# Patient Record
Sex: MALE | Race: WHITE | NOT HISPANIC OR LATINO | Employment: OTHER | URBAN - METROPOLITAN AREA
[De-identification: names, ages, dates, MRNs, and addresses within clinical notes are randomized per-mention and may not be internally consistent; named-entity substitution may affect disease eponyms.]

---

## 2023-04-18 ENCOUNTER — LAB (OUTPATIENT)
Dept: LAB | Facility: LAB | Age: 68
End: 2023-04-18
Payer: MEDICARE

## 2023-04-18 ENCOUNTER — OFFICE VISIT (OUTPATIENT)
Dept: PRIMARY CARE | Facility: CLINIC | Age: 68
End: 2023-04-18
Payer: MEDICARE

## 2023-04-18 VITALS
BODY MASS INDEX: 34.41 KG/M2 | WEIGHT: 279 LBS | OXYGEN SATURATION: 97 % | SYSTOLIC BLOOD PRESSURE: 142 MMHG | DIASTOLIC BLOOD PRESSURE: 82 MMHG | HEART RATE: 79 BPM

## 2023-04-18 DIAGNOSIS — Z01.818 PRE-OPERATIVE EXAM: ICD-10-CM

## 2023-04-18 DIAGNOSIS — R73.03 PREDIABETES: ICD-10-CM

## 2023-04-18 DIAGNOSIS — E04.9 THYROID ENLARGEMENT: ICD-10-CM

## 2023-04-18 DIAGNOSIS — R79.89 ELEVATED SERUM CREATININE: ICD-10-CM

## 2023-04-18 DIAGNOSIS — E78.1 HYPERTRIGLYCERIDEMIA: ICD-10-CM

## 2023-04-18 DIAGNOSIS — I77.810 AORTIC ECTASIA, THORACIC (CMS-HCC): Primary | ICD-10-CM

## 2023-04-18 DIAGNOSIS — I35.8: ICD-10-CM

## 2023-04-18 LAB
ALANINE AMINOTRANSFERASE (SGPT) (U/L) IN SER/PLAS: 31 U/L (ref 10–52)
ALBUMIN (G/DL) IN SER/PLAS: 4.7 G/DL (ref 3.4–5)
ALKALINE PHOSPHATASE (U/L) IN SER/PLAS: 54 U/L (ref 33–136)
ANION GAP IN SER/PLAS: 10 MMOL/L (ref 10–20)
ASPARTATE AMINOTRANSFERASE (SGOT) (U/L) IN SER/PLAS: 25 U/L (ref 9–39)
BILIRUBIN TOTAL (MG/DL) IN SER/PLAS: 0.5 MG/DL (ref 0–1.2)
CALCIUM (MG/DL) IN SER/PLAS: 9.6 MG/DL (ref 8.6–10.3)
CARBON DIOXIDE, TOTAL (MMOL/L) IN SER/PLAS: 30 MMOL/L (ref 21–32)
CHLORIDE (MMOL/L) IN SER/PLAS: 104 MMOL/L (ref 98–107)
CHOLESTEROL (MG/DL) IN SER/PLAS: 158 MG/DL (ref 0–199)
CHOLESTEROL IN HDL (MG/DL) IN SER/PLAS: 41 MG/DL
CHOLESTEROL/HDL RATIO: 3.9
CREATININE (MG/DL) IN SER/PLAS: 1.39 MG/DL (ref 0.5–1.3)
ESTIMATED AVERAGE GLUCOSE FOR HBA1C: 134 MG/DL
GFR MALE: 56 ML/MIN/1.73M2
GLUCOSE (MG/DL) IN SER/PLAS: 111 MG/DL (ref 74–99)
HEMOGLOBIN A1C/HEMOGLOBIN TOTAL IN BLOOD: 6.3 %
LDL: 93 MG/DL (ref 0–99)
POTASSIUM (MMOL/L) IN SER/PLAS: 4.7 MMOL/L (ref 3.5–5.3)
PROTEIN TOTAL: 7.6 G/DL (ref 6.4–8.2)
SODIUM (MMOL/L) IN SER/PLAS: 139 MMOL/L (ref 136–145)
TRIGLYCERIDE (MG/DL) IN SER/PLAS: 121 MG/DL (ref 0–149)
UREA NITROGEN (MG/DL) IN SER/PLAS: 40 MG/DL (ref 6–23)
VLDL: 24 MG/DL (ref 0–40)

## 2023-04-18 PROCEDURE — 1036F TOBACCO NON-USER: CPT | Performed by: STUDENT IN AN ORGANIZED HEALTH CARE EDUCATION/TRAINING PROGRAM

## 2023-04-18 PROCEDURE — 1159F MED LIST DOCD IN RCRD: CPT | Performed by: STUDENT IN AN ORGANIZED HEALTH CARE EDUCATION/TRAINING PROGRAM

## 2023-04-18 PROCEDURE — 83036 HEMOGLOBIN GLYCOSYLATED A1C: CPT

## 2023-04-18 PROCEDURE — 93000 ELECTROCARDIOGRAM COMPLETE: CPT | Performed by: STUDENT IN AN ORGANIZED HEALTH CARE EDUCATION/TRAINING PROGRAM

## 2023-04-18 PROCEDURE — 99213 OFFICE O/P EST LOW 20 MIN: CPT | Performed by: STUDENT IN AN ORGANIZED HEALTH CARE EDUCATION/TRAINING PROGRAM

## 2023-04-18 PROCEDURE — 36415 COLL VENOUS BLD VENIPUNCTURE: CPT

## 2023-04-18 PROCEDURE — 80053 COMPREHEN METABOLIC PANEL: CPT

## 2023-04-18 PROCEDURE — 80061 LIPID PANEL: CPT

## 2023-04-18 RX ORDER — FENOFIBRATE 48 MG/1
1 TABLET, FILM COATED ORAL DAILY
COMMUNITY
Start: 2022-10-10 | End: 2023-10-03 | Stop reason: SINTOL

## 2023-04-18 RX ORDER — ENALAPRIL MALEATE AND HYDROCHLOROTHIAZIDE 5; 12.5 MG/1; MG/1
1 TABLET ORAL DAILY
COMMUNITY
Start: 2021-08-30 | End: 2023-10-03 | Stop reason: SDUPTHER

## 2023-04-18 RX ORDER — ASPIRIN 81 MG/1
81 TABLET ORAL DAILY
COMMUNITY

## 2023-04-18 ASSESSMENT — PATIENT HEALTH QUESTIONNAIRE - PHQ9
1. LITTLE INTEREST OR PLEASURE IN DOING THINGS: NOT AT ALL
SUM OF ALL RESPONSES TO PHQ9 QUESTIONS 1 AND 2: 0
2. FEELING DOWN, DEPRESSED OR HOPELESS: NOT AT ALL

## 2023-04-18 NOTE — PROGRESS NOTES
Subjective   Patient ID: Ji Nguyen Jr. is a 68 y.o. male who presents for Follow-up (Patient is looking at having hip surgery on the left side. Ortho has concerns with A1C and patient states he needs cardiac clearance. ).    HPI    Patient reports that he is planning to have a hip surgery in the future.  He wants to optimize his health in preparation of the surgery.  Reports that he has a history of blood vessel dilatation.  His surgeon would like to get this reevaluated before planning for surgery.  Additionally recommended to discuss about diabetes as well.  Plan:  Hx of ectasia of ascending aorta. ECHO ordered for further assessment of ectasia.   EKG performed-normal.   Echocardiogram is ordered for further evaluation of ectasia of aorta.      Review of Systems  ROS negative except discussed above in HPI.    Vitals:    04/18/23 1008   BP: 142/82   Pulse: 79   SpO2: 97%     Objective   Physical Exam  Constitutional:       Appearance: Normal appearance.   Cardiovascular:      Rate and Rhythm: Normal rate and regular rhythm.   Pulmonary:      Effort: Pulmonary effort is normal.      Breath sounds: Normal breath sounds.   Musculoskeletal:      Cervical back: Normal range of motion and neck supple.   Lymphadenopathy:      Cervical: No cervical adenopathy.   Neurological:      Mental Status: He is alert.       Assessment/Plan   Ji was seen today for follow-up.  Diagnoses and all orders for this visit:  Aortic ectasia, thoracic (CMS/HCC) (Primary)  -     Cancel: Transthoracic Echo (TTE) Complete; Future  -     CT chest w IV contrast; Future  -     CT chest w IV contrast  -     Referral to Cardiology; Future  Pre-operative exam  -     Comprehensive Metabolic Panel; Future  -     ECG 12 lead (Clinic Performed)  Hypertriglyceridemia  -     Lipid Panel; Future  Prediabetes  -     Hemoglobin A1C; Future  -     Comprehensive Metabolic Panel; Future  Annuloaortic ectasia  -     Cancel: Transthoracic Echo (TTE) Complete;  Future  Elevated serum creatinine  -     Basic metabolic panel; Future  Thyroid enlargement  -     TSH with reflex to Free T4 if abnormal; Future  -     US thyroid; Future      Follow up in October as planned.          Paul Garcia MD MPH    Addendum with results: Creatinine elevated and GFR decreased. Will repeat in a week without fasting. If persistently low then will consider discontinuing fenofibrate.     CT with stable ectasia -4.3cm  Thyroid enlargement noticed. US ordered for further eval.    US of thyroid - goiter; possible thyroiditis. Nodules as well. Referral to endo is recommended. Patient will call us and let us know.

## 2023-04-26 ENCOUNTER — LAB (OUTPATIENT)
Dept: LAB | Facility: LAB | Age: 68
End: 2023-04-26
Payer: MEDICARE

## 2023-04-26 DIAGNOSIS — R79.89 ELEVATED SERUM CREATININE: ICD-10-CM

## 2023-04-26 LAB
ANION GAP IN SER/PLAS: 11 MMOL/L (ref 10–20)
CALCIUM (MG/DL) IN SER/PLAS: 9.3 MG/DL (ref 8.6–10.3)
CARBON DIOXIDE, TOTAL (MMOL/L) IN SER/PLAS: 30 MMOL/L (ref 21–32)
CHLORIDE (MMOL/L) IN SER/PLAS: 102 MMOL/L (ref 98–107)
CREATININE (MG/DL) IN SER/PLAS: 1.23 MG/DL (ref 0.5–1.3)
GFR MALE: 64 ML/MIN/1.73M2
GLUCOSE (MG/DL) IN SER/PLAS: 90 MG/DL (ref 74–99)
POTASSIUM (MMOL/L) IN SER/PLAS: 4 MMOL/L (ref 3.5–5.3)
SODIUM (MMOL/L) IN SER/PLAS: 139 MMOL/L (ref 136–145)
UREA NITROGEN (MG/DL) IN SER/PLAS: 34 MG/DL (ref 6–23)

## 2023-04-26 PROCEDURE — 80048 BASIC METABOLIC PNL TOTAL CA: CPT

## 2023-04-26 PROCEDURE — 36415 COLL VENOUS BLD VENIPUNCTURE: CPT

## 2023-05-10 ENCOUNTER — LAB (OUTPATIENT)
Dept: LAB | Facility: LAB | Age: 68
End: 2023-05-10
Payer: MEDICARE

## 2023-05-10 DIAGNOSIS — E04.9 THYROID ENLARGEMENT: ICD-10-CM

## 2023-05-10 LAB — THYROTROPIN (MIU/L) IN SER/PLAS BY DETECTION LIMIT <= 0.05 MIU/L: 1.24 MIU/L (ref 0.44–3.98)

## 2023-05-10 PROCEDURE — 84443 ASSAY THYROID STIM HORMONE: CPT

## 2023-05-10 PROCEDURE — 36415 COLL VENOUS BLD VENIPUNCTURE: CPT

## 2023-08-03 LAB
ALANINE AMINOTRANSFERASE (SGPT) (U/L) IN SER/PLAS: 28 U/L (ref 10–52)
ALBUMIN (G/DL) IN SER/PLAS: 4.6 G/DL (ref 3.4–5)
ALKALINE PHOSPHATASE (U/L) IN SER/PLAS: 59 U/L (ref 33–136)
ANION GAP IN SER/PLAS: 11 MMOL/L (ref 10–20)
ASPARTATE AMINOTRANSFERASE (SGOT) (U/L) IN SER/PLAS: 19 U/L (ref 9–39)
BASOPHILS (10*3/UL) IN BLOOD BY AUTOMATED COUNT: 0.04 X10E9/L (ref 0–0.1)
BASOPHILS/100 LEUKOCYTES IN BLOOD BY AUTOMATED COUNT: 0.6 % (ref 0–2)
BILIRUBIN TOTAL (MG/DL) IN SER/PLAS: 0.6 MG/DL (ref 0–1.2)
CALCIUM (MG/DL) IN SER/PLAS: 9.4 MG/DL (ref 8.6–10.3)
CARBON DIOXIDE, TOTAL (MMOL/L) IN SER/PLAS: 30 MMOL/L (ref 21–32)
CHLORIDE (MMOL/L) IN SER/PLAS: 102 MMOL/L (ref 98–107)
CREATININE (MG/DL) IN SER/PLAS: 1.1 MG/DL (ref 0.5–1.3)
EOSINOPHILS (10*3/UL) IN BLOOD BY AUTOMATED COUNT: 0.24 X10E9/L (ref 0–0.7)
EOSINOPHILS/100 LEUKOCYTES IN BLOOD BY AUTOMATED COUNT: 3.6 % (ref 0–6)
ERYTHROCYTE DISTRIBUTION WIDTH (RATIO) BY AUTOMATED COUNT: 12.9 % (ref 11.5–14.5)
ERYTHROCYTE MEAN CORPUSCULAR HEMOGLOBIN CONCENTRATION (G/DL) BY AUTOMATED: 33.4 G/DL (ref 32–36)
ERYTHROCYTE MEAN CORPUSCULAR VOLUME (FL) BY AUTOMATED COUNT: 96 FL (ref 80–100)
ERYTHROCYTES (10*6/UL) IN BLOOD BY AUTOMATED COUNT: 5.63 X10E12/L (ref 4.5–5.9)
ESTIMATED AVERAGE GLUCOSE FOR HBA1C: 128 MG/DL
GFR MALE: 73 ML/MIN/1.73M2
GLUCOSE (MG/DL) IN SER/PLAS: 122 MG/DL (ref 74–99)
HEMATOCRIT (%) IN BLOOD BY AUTOMATED COUNT: 53.9 % (ref 41–52)
HEMOGLOBIN (G/DL) IN BLOOD: 18 G/DL (ref 13.5–17.5)
HEMOGLOBIN A1C/HEMOGLOBIN TOTAL IN BLOOD: 6.1 %
IMMATURE GRANULOCYTES/100 LEUKOCYTES IN BLOOD BY AUTOMATED COUNT: 0.2 % (ref 0–0.9)
LEUKOCYTES (10*3/UL) IN BLOOD BY AUTOMATED COUNT: 6.6 X10E9/L (ref 4.4–11.3)
LYMPHOCYTES (10*3/UL) IN BLOOD BY AUTOMATED COUNT: 2.18 X10E9/L (ref 1.2–4.8)
LYMPHOCYTES/100 LEUKOCYTES IN BLOOD BY AUTOMATED COUNT: 33 % (ref 13–44)
MONOCYTES (10*3/UL) IN BLOOD BY AUTOMATED COUNT: 0.73 X10E9/L (ref 0.1–1)
MONOCYTES/100 LEUKOCYTES IN BLOOD BY AUTOMATED COUNT: 11.1 % (ref 2–10)
NEUTROPHILS (10*3/UL) IN BLOOD BY AUTOMATED COUNT: 3.4 X10E9/L (ref 1.2–7.7)
NEUTROPHILS/100 LEUKOCYTES IN BLOOD BY AUTOMATED COUNT: 51.5 % (ref 40–80)
PLATELETS (10*3/UL) IN BLOOD AUTOMATED COUNT: 207 X10E9/L (ref 150–450)
POTASSIUM (MMOL/L) IN SER/PLAS: 4.3 MMOL/L (ref 3.5–5.3)
PROTEIN TOTAL: 7.1 G/DL (ref 6.4–8.2)
SODIUM (MMOL/L) IN SER/PLAS: 139 MMOL/L (ref 136–145)
UREA NITROGEN (MG/DL) IN SER/PLAS: 32 MG/DL (ref 6–23)

## 2023-08-10 ENCOUNTER — TELEPHONE (OUTPATIENT)
Dept: PRIMARY CARE | Facility: CLINIC | Age: 68
End: 2023-08-10
Payer: MEDICARE

## 2023-08-10 NOTE — TELEPHONE ENCOUNTER
Patient called and said he is on vaction and has surgery with Dr. Gastelum on Monday for a total hip replacement. Patient is concerned that something is going to be wrong and he is going to have to push his surgery back. Patient said he is on vacation and wasn't sure if  could sign off on him having the surgery. Pt is on vacation at the moment and will leave to come to the apt if he has to but he says Dr. Gastelum never told him he needed clearance from his primary care.   Please call him as soon as possible.   Thank you

## 2023-08-11 ENCOUNTER — OFFICE VISIT (OUTPATIENT)
Dept: PRIMARY CARE | Facility: CLINIC | Age: 68
End: 2023-08-11
Payer: MEDICARE

## 2023-08-11 VITALS
HEART RATE: 72 BPM | SYSTOLIC BLOOD PRESSURE: 132 MMHG | WEIGHT: 277.6 LBS | DIASTOLIC BLOOD PRESSURE: 78 MMHG | BODY MASS INDEX: 34.24 KG/M2 | OXYGEN SATURATION: 93 %

## 2023-08-11 DIAGNOSIS — R73.03 PRE-DIABETES: ICD-10-CM

## 2023-08-11 DIAGNOSIS — Z01.818 PRE-OPERATIVE EXAM: Primary | ICD-10-CM

## 2023-08-11 DIAGNOSIS — I10 PRIMARY HYPERTENSION: ICD-10-CM

## 2023-08-11 PROBLEM — L91.8 SKIN TAG: Status: ACTIVE | Noted: 2023-08-11

## 2023-08-11 PROBLEM — M16.12 ARTHRITIS OF LEFT HIP: Status: ACTIVE | Noted: 2023-08-11

## 2023-08-11 PROBLEM — E78.1 HYPERTRIGLYCERIDEMIA: Status: ACTIVE | Noted: 2023-08-11

## 2023-08-11 PROBLEM — I77.810 ASCENDING AORTA DILATION (CMS-HCC): Status: ACTIVE | Noted: 2023-08-11

## 2023-08-11 PROBLEM — M25.552 HIP PAIN, LEFT: Status: ACTIVE | Noted: 2023-08-11

## 2023-08-11 PROBLEM — N52.9 MALE ERECTILE DISORDER: Status: ACTIVE | Noted: 2023-08-11

## 2023-08-11 PROBLEM — E66.9 OBESITY: Status: ACTIVE | Noted: 2023-08-11

## 2023-08-11 PROCEDURE — 1036F TOBACCO NON-USER: CPT | Performed by: STUDENT IN AN ORGANIZED HEALTH CARE EDUCATION/TRAINING PROGRAM

## 2023-08-11 PROCEDURE — 3078F DIAST BP <80 MM HG: CPT | Performed by: STUDENT IN AN ORGANIZED HEALTH CARE EDUCATION/TRAINING PROGRAM

## 2023-08-11 PROCEDURE — 3075F SYST BP GE 130 - 139MM HG: CPT | Performed by: STUDENT IN AN ORGANIZED HEALTH CARE EDUCATION/TRAINING PROGRAM

## 2023-08-11 PROCEDURE — 99213 OFFICE O/P EST LOW 20 MIN: CPT | Performed by: STUDENT IN AN ORGANIZED HEALTH CARE EDUCATION/TRAINING PROGRAM

## 2023-08-11 PROCEDURE — 1159F MED LIST DOCD IN RCRD: CPT | Performed by: STUDENT IN AN ORGANIZED HEALTH CARE EDUCATION/TRAINING PROGRAM

## 2023-08-11 RX ORDER — DICLOFENAC SODIUM 10 MG/G
GEL TOPICAL
COMMUNITY
Start: 2023-05-26

## 2023-08-11 ASSESSMENT — PATIENT HEALTH QUESTIONNAIRE - PHQ9
2. FEELING DOWN, DEPRESSED OR HOPELESS: NOT AT ALL
1. LITTLE INTEREST OR PLEASURE IN DOING THINGS: NOT AT ALL
SUM OF ALL RESPONSES TO PHQ9 QUESTIONS 1 AND 2: 0

## 2023-08-11 NOTE — PROGRESS NOTES
Subjective   Patient ID: Ji Nguyen Jr. is a 68 y.o. male who presents for Surgery Clearance (Surgery Clearance scheduled for Monday, did not know he needed to have clearance, thought he had everything in place. Found out he needed clearance so he had another EKG and labs done and they were ok with the recent stuff, wanted his PCP to do a once over before surgery. Is having a total hip replacement for his left. ).    HPI    Pre-operative clearance.     Patient is planned for a hip surgery on Monday.     Has had hx of appendicitis where he received anesthesia and did not have complications.   No bleeding diathesis.    Revised Cardiac Risk Index for Pre-Operative Risk  Estimates risk of cardiac complications after noncardiac surgery= 4    Has hx of prediabetes which is under control with A1C of 6.1. Diet controlled.       Review of Systems  ROS negative except discussed above in HPI.    Vitals:    08/11/23 1514   BP: 132/78   Pulse: 72   SpO2:      Objective   Physical Exam  Constitutional:       Appearance: Normal appearance.   Cardiovascular:      Rate and Rhythm: Normal rate and regular rhythm.   Pulmonary:      Effort: Pulmonary effort is normal.      Breath sounds: Normal breath sounds.   Musculoskeletal:      Cervical back: Normal range of motion and neck supple.      Comments: Antalgic gait due to hip pain.    Lymphadenopathy:      Cervical: No cervical adenopathy.   Neurological:      Mental Status: He is alert.       Assessment/Plan   Ji was seen today for surgery clearance.  Diagnoses and all orders for this visit:  Pre-operative exam (Primary)  Pre-diabetes  Primary hypertension    Patient medically stable for the procedure planned.    Follow up as needed.     Paul Garcia MD MPH

## 2023-08-14 ENCOUNTER — HOSPITAL ENCOUNTER (OUTPATIENT)
Dept: DATA CONVERSION | Facility: HOSPITAL | Age: 68
End: 2023-08-15
Attending: SPECIALIST | Admitting: SPECIALIST
Payer: MEDICARE

## 2023-08-14 DIAGNOSIS — M25.752 OSTEOPHYTE, LEFT HIP: ICD-10-CM

## 2023-08-14 DIAGNOSIS — M16.12 UNILATERAL PRIMARY OSTEOARTHRITIS, LEFT HIP: ICD-10-CM

## 2023-08-14 DIAGNOSIS — M25.552 PAIN IN LEFT HIP: ICD-10-CM

## 2023-08-15 LAB
ANION GAP IN SER/PLAS: 12 MMOL/L (ref 10–20)
BASOPHILS (10*3/UL) IN BLOOD BY AUTOMATED COUNT: 0.02 X10E9/L (ref 0–0.1)
BASOPHILS/100 LEUKOCYTES IN BLOOD BY AUTOMATED COUNT: 0.1 % (ref 0–2)
CALCIUM (MG/DL) IN SER/PLAS: 8.5 MG/DL (ref 8.6–10.3)
CARBON DIOXIDE, TOTAL (MMOL/L) IN SER/PLAS: 25 MMOL/L (ref 21–32)
CHLORIDE (MMOL/L) IN SER/PLAS: 99 MMOL/L (ref 98–107)
CREATININE (MG/DL) IN SER/PLAS: 1.17 MG/DL (ref 0.5–1.3)
ERYTHROCYTE DISTRIBUTION WIDTH (RATIO) BY AUTOMATED COUNT: 12.6 % (ref 11.5–14.5)
ERYTHROCYTE MEAN CORPUSCULAR HEMOGLOBIN CONCENTRATION (G/DL) BY AUTOMATED: 33.3 G/DL (ref 32–36)
ERYTHROCYTE MEAN CORPUSCULAR VOLUME (FL) BY AUTOMATED COUNT: 96 FL (ref 80–100)
ERYTHROCYTES (10*6/UL) IN BLOOD BY AUTOMATED COUNT: 4.25 X10E12/L (ref 4.5–5.9)
GFR MALE: 68 ML/MIN/1.73M2
GLUCOSE (MG/DL) IN SER/PLAS: 165 MG/DL (ref 74–99)
HEMATOCRIT (%) IN BLOOD BY AUTOMATED COUNT: 40.6 % (ref 41–52)
HEMOGLOBIN (G/DL) IN BLOOD: 13.5 G/DL (ref 13.5–17.5)
IMMATURE GRANULOCYTES/100 LEUKOCYTES IN BLOOD BY AUTOMATED COUNT: 0.3 % (ref 0–0.9)
LEUKOCYTES (10*3/UL) IN BLOOD BY AUTOMATED COUNT: 13.6 X10E9/L (ref 4.4–11.3)
LYMPHOCYTES (10*3/UL) IN BLOOD BY AUTOMATED COUNT: 1.5 X10E9/L (ref 1.2–4.8)
LYMPHOCYTES/100 LEUKOCYTES IN BLOOD BY AUTOMATED COUNT: 11 % (ref 13–44)
MONOCYTES (10*3/UL) IN BLOOD BY AUTOMATED COUNT: 1.59 X10E9/L (ref 0.1–1)
MONOCYTES/100 LEUKOCYTES IN BLOOD BY AUTOMATED COUNT: 11.7 % (ref 2–10)
NEUTROPHILS (10*3/UL) IN BLOOD BY AUTOMATED COUNT: 10.46 X10E9/L (ref 1.2–7.7)
NEUTROPHILS/100 LEUKOCYTES IN BLOOD BY AUTOMATED COUNT: 76.9 % (ref 40–80)
PLATELETS (10*3/UL) IN BLOOD AUTOMATED COUNT: 184 X10E9/L (ref 150–450)
POTASSIUM (MMOL/L) IN SER/PLAS: 4.3 MMOL/L (ref 3.5–5.3)
SODIUM (MMOL/L) IN SER/PLAS: 132 MMOL/L (ref 136–145)
UREA NITROGEN (MG/DL) IN SER/PLAS: 41 MG/DL (ref 6–23)

## 2023-08-28 ENCOUNTER — APPOINTMENT (OUTPATIENT)
Dept: PRIMARY CARE | Facility: CLINIC | Age: 68
End: 2023-08-28
Payer: MEDICARE

## 2023-09-29 VITALS — WEIGHT: 293.87 LBS | OXYGEN SATURATION: 99 % | BODY MASS INDEX: 39.8 KG/M2 | HEIGHT: 72 IN

## 2023-09-30 NOTE — PROGRESS NOTES
Service: Orthopaedics     Subjective Data:   ALTAGRACIA PALMA is a 68 year old Male who is Hospital Day # 2 and POD #1 for Left Total hip replacement.     doing well.  He walked in the hallway about 1/2 way.    Objective Data:     Objective Information:      T   P  R  BP   MAP  SpO2   Value  36.2  78  17  126/76      96%  Date/Time 8/15 3:21 8/15 3:21 8/15 3:21 8/15 3:21    8/15 3:21  Range  (36.1C - 36.6C )  (78 - 84 )  (17 - 18 )  (110 - 139 )/ (68 - 84 )    (91% - 96% )      Pain reported at 8/15 6:08: 2 = Mild      T   P  R  BP   MAP  SpO2   Value  36.2  78  17  126/76      96%  Date/Time 8/15 3:21 8/15 3:21 8/15 3:21 8/15 3:21    8/15 3:21  Range  (36.1C - 36.6C )  (78 - 84 )  (17 - 18 )  (110 - 139 )/ (68 - 84 )    (91% - 96% )        Pain reported at 8/15 6:08: 2 = Mild         Weights    15:15: Weight in kg (Weight (kg))  133.3   15:15: Weight in lbs ((lbs))  293.8   15:15: BMI (kg/m2) (BMI (kg/m2))  39.544    Physical Exam by System:    Extremities: left hip  dressing with min serosang.  NV intact distally  calves NT bilat  gentle ROM NT     Medication:    Medications:          Continuous Medications       --------------------------------    1. Lactated Ringers Infusion:  1000  mL  IntraVenous  <Continuous>    2. Lactated Ringers Infusion:  1000  mL  IntraVenous  <Continuous>         Scheduled Medications       --------------------------------    1. Docusate:  100  mg  Oral  2 Times a Day    2. Enoxaparin SubCutaneous:  40  mg  SubCutaneous  Every 24 Hours    3. Polyethylene Glycol:  17  gram(s)  Oral  2 Times a Day         PRN Medications       --------------------------------    1. Aspirin Enteric Coated:  325  mg  Oral  Daily    2. diphenhydrAMINE Injectable:  25  mg  IntraVenous Push  Every 4 Hours    3. HYDROcodone 5 mg - Acetaminophen 325 m  tablet(s)  Oral  Every 4 Hours    4. HYDROmorphone Injectable:  0.2  mg  IntraVenous Push  Every 5 Minutes    5. Morphine Injectable:  2  mg   IntraVenous Push  Every 2 Hours    6. Naloxone Injectable:  0.1  mg  IntraVenous Push  Every 5 Minutes    7. Ondansetron Injectable:  4  mg  IntraVenous Push  Every 6 Hours        Recent Lab Results:    Results:    CBC: 8/15/2023 05:10              \     Hgb     /                              \     13.5       /  WBC  ----------------  Plt               13.6 H    ----------------    184              /     Hct     \                              /     40.6 L    \            RBC: 4.25 L    MCV: 96     Neutrophil %: 76.9      BMP: 8/15/2023 05:10  NA+        Cl-     BUN  /                         132 L   99    41 H  /  --------------------------------  Glucose                ---------------------------  165 H    K+     HCO3-   Creat \                         4.3  25    1.17  \  Calcium : 8.5 L    Anion Gap : 12      Assessment and Plan:   Comorbidities:  ·  Comorbidity Other     Code Status:  ·  Code Status Full Code     Advance Care Planning:  Advance Care Planning: Patient didn't wish to or  was unable to provide advance care plan       Impression 1: POD# 1 THR   Plan for Impression 1: OK for d/c  Hip precautions discussed  Percocet for pain.  f/U 1 week for re-eval.       Electronic Signatures:  Dima Gastelum)  (Signed 15-Aug-2023 07:33)   Authored: Service, Subjective Data, Objective Data, Assessment  and Plan, Note Completion      Last Updated: 15-Aug-2023 07:33 by Dima Gastelum)

## 2023-09-30 NOTE — H&P
History & Physical Reviewed:   I have reviewed the History and Physical dated:  14-Aug-2023   History and Physical reviewed and relevant findings noted. Patient examined to review pertinent physical  findings.: No significant changes   Home Medications Reviewed: no changes noted   Allergies Reviewed: no changes noted       ERAS (Enhanced Recovery After Surgery):  ·  ERAS Patient: yes   ·  CPM/PAT Utilization: no   ·  Immunonutrition Recovery Drink Utilization: no   ·  Carbohydrate Supplement Drink Utilization: no     Consent:   COVID-19 Consent:  ·  COVID-19 Risk Consent Surgeon has reviewed key risks related to the risk of jules COVID-19 and if they contract COVID-19 what the risks are.       Electronic Signatures:  Dima Gastelum)  (Signed 14-Aug-2023 08:55)   Authored: History & Physical Reviewed, ERAS, Consent,  Note Completion      Last Updated: 14-Aug-2023 08:55 by Dima Gastelum)

## 2023-09-30 NOTE — DISCHARGE SUMMARY
Send Summary:   Discharge Summary Providers:  Provider Role Provider Name   · Attending Dima Gastelum   · Referring Dima Gastelum   · Primary Paul Garcia       Note Recipients: Dima Gastelum MD Mallapareddi, Arun Nag S, MD       Discharge:    Summary:   Admission Date: .14-Aug-2023 08:20:00   Discharge Date: 15-Aug-2023   Attending Physician at Discharge: Nirav   Admission Reason: THR   Final Discharge Diagnoses: Hip arthritis   Procedures: Date: 14-Aug-2023 13:42:00  Procedure Name: Left Total hip replacement   Condition at Discharge: Satisfactory   Disposition at Discharge: home   Vital Signs:        T   P  R  BP   MAP  SpO2   Value  36.2  78  17  126/76      96%  Date/Time 8/15 3:21 8/15 3:21 8/15 3:21 8/15 3:21    8/15 3:21  Range  (36.1C - 36.6C )  (78 - 84 )  (17 - 18 )  (110 - 139 )/ (68 - 84 )    (91% - 96% )    Date:            Weight/Scale Type:  Height:   01-Aug-2023 13:30  124.9  kg              Physical Exam:    see progress note.  Hospital Course:    Pt had a L THR on 8/14/23. He had an uneventful PO course.  D/C on ASA for DVT and percocet for pain.      Discharge Information:    and Continuing Care:   Lab Results - Pending:    None  Radiology Results - Pending: Xray Hip 1 View at 14-Aug-2023  14:55:00   Discharge Instructions:    Additional Orders:           Additional Instructions:   Discharge plan    ASA by mouth every day  Percocet for pain  ambulate with walker  ankle pumps  incentive spirometry  F/U with [facility] within 10 days\parF/U with Family Doctor within 2 weeks of D/C    Follow Up Appointments:    Follow-Up Appointment 01:           Physician/Dept/Service:   PCP          Call to Schedule in:   2 weeks    Follow-Up Appointment 02:           Physician/Dept/Service:   Nirav          Call to Schedule in:   1 week    Discharge Medications: Home Medication   enalapril-hydrochlorothiazide 5 mg-12.5 mg oral tablet - 1 tab(s) orally once a day  Omega Essentials 667 mg oral capsule - 1 cap(s)  orally once a day  Multiple Vitamins oral tablet - 1 tab(s) orally once a day  oxycodone-acetaminophen 5 mg-325 mg oral tablet - 1 tab(s) orally every 6 hours      PRN Medication     DNR Status:   ·  Code Status Code Status order at time of discharge: Full Code       Electronic Signatures:  Dima Gastelum)  (Signed 15-Aug-2023 07:46)   Authored: Send Summary, Summary Content, Ongoing Care,  DNR Status, Note Completion      Last Updated: 15-Aug-2023 07:46 by Dima Gastelum)

## 2023-10-01 NOTE — OP NOTE
PROCEDURE DETAILS    Preoperative Diagnosis:  Left hip OA   Postoperative Diagnosis:  Left hip OA   Surgeon: Nirav  Resident/Fellow/Other Assistant: Derrick    Procedure:  Left Total hip replacement  Estimated Blood Loss: 0  Findings: OA severe  Specimens(s) Collected: no,     Complications: None  Implants: #8 femur.  60mm acetabulum. -4 femoral neck        Operative Report:   Patient was advised of the risks, benefits, alternatives and complications of a TOTAL HIP ARTHROPLASTY and the patient agreed to proceed. The patient was informed  of the risk of DVT, infection, nerve and blood vessel injury. The patient understood that infection with a hip arthroplasty is a serious situation that may require further surgery and multiple visits to the operating room in order to eradicate the infection  in addition to long term IV antibiotics. This would be followed with a very high probability of removal of the implants and possible need for revision surgery. The risks of hip instability and dislocation were discussed. I reviewed hip precautions with  him and he agrees to adhere to these.  The risks of anesthesia were discussed.  The need for cemented fixation was discussed. Often, arthroplasty patients require blood transfusions intra-operative or post-operative.          Indications for Total Hip Arthroplasy:   The patient failed conservative management for Osteoarthritis of the hip. This often includes oral anti-inflammatory medications and activity modification. The patient had obvious findings of arthritis on pre-operative X-ray evaluation. The patient's  physical exam revealed tenderness over the groin and buttock especially with internal rotation. The patient had limitation of range of motion as well as an antalgic limp preoperatively. The level of hip pain was severe enough to compromise the patient's  overall health due to lack of activity as well as lack of sleep.  Therefore, this patient is indicated for a left  Total hip Replacement.    Informed Consent:   Patient was advised of the risks, benefits, alternatives and complications of a LEFT TOTAL HIP ARTHROPLASTY and the patient agreed to proceed. The patient was informed of the risk of DVT, infection, nerve and blood vessel injury. The patient understood  that infection with a hip arthroplasty is a serious situation that may require further surgery and multiple visits to the operating room to eradicate the infection in addition to long term IV antibiotics. This would be followed with a very high probability  of removal of the implants and possible need for revision surgery. The risks of hip instability and dislocation were discussed. I reviewed hip precautions with the patient and they agree to adhere to these.  The risks of anesthesia were discussed. The  risks of instrument failure were outlined. The need for cemented fixation was discussed. Often, arthroplasty patients require blood transfusions intra-operative or post-operative.    Procedure: The patient was identified verbally and using their hospital ID tag.  The maximino on the operative site which had been placed by the surgeon in the PACU was confirmed.  A surgical timeout was performed confirming and concurring this was the correct  patient and correct side and this was in agreement with the signed consent form.  Appropriate pre-operative antibiotics had been delivered. All the equipment necessary for the proposed procedure was available. Any necessary blood replacement or products  had been reserved. Medical issues affecting the surgery were discussed. A peg board was used for positioning in the lateral decubitus position. The patient´s positioning was secured, and all bony prominences and superficial nerves were inspected to  be well padded and free of compression. All present in the operating room were polled and agreed to proceed.    The non-operative side was dependent, and the operative hip was exposed to the  surgeon. The patient was appropriately sterilely prepped and draped. Surgical landmarks were outlined on the skin with an appropriate marker and the surgical incision was outlined.  An Ioban dressing was applied to the skin.     A curvilinear skin incision was made with a #10 blade scalpel.  This was taken from the posterior superior iliac spine across the posterior one-third of the greater trochanter and along the femoral shaft.  This was approximately 18 centimeters.  The subcutaneous  fat was opened with Bovie electrocautery and hemostasis was obtained.  The posterior fascia of the Gluteus Miranda was identified. The tensor facsia tonia was identified. This was incised sharply with the scalpel just distal to the tensor fascia muscle.   The surgeon´s finger was used to palpate the Gluteus Miranda insertion into the femur.  Once it was confirmed that the incision was anterior to the Gluteus Miranda insertion the fascia tonia was opened coincident with the skin incision. The external  fascia of the gluteus miranda was opened as well. The muscle was bluntly divided. Care was taken to control any perforating vessels.  A Charnley retractor was placed opening the two limbs of the fascial split. Care was taken to identify and protect the  sciatic nerve in this endeavor.     The femur was internally rotated.  The short external rotators, the piriformis tendon and the quadratus femorus were identified. The tendons for the gluteus medius and minimus were identified and using a periosteal elevator removed from the posterior  capsule of the hip. A blunt Cobra retractor was placed around the femoral neck to expose this and retract those tendons. The tendon of the piriformis was tagged with suture and the piriformis was detached from its fossa with Bovie electrocautery. The  other short external rotators were similarly tagged and detached. These were used to further protect the sciatic nerve. The quadratus femoris was detached in  its upper one-third as needed to expose the posterior capsule. The capsule was incised in a T  shape at the base of the femoral head and extending down to the acetabulum. The lessor trochanter was identified and easily palpable. The hip was then dislocated.  The femoral neck inclination was marked with Bovie, and a surgical saw was used to cut  the femoral neck and remove the head. The femoral head was measured on the back table. The acetabulum was cleared of soft tissue with Bovie electrocautery. The ligamentum teres was removed with Bovie electrocautery. An anterior acetabular retractor was  carefully placed to help retract the proximal femur and expose the acetabulum circumferentially. Any overhanging labrum was removed. A posterior and inferior retractor was placed. The adequately exposed the acetabulum. The pulvinar was cleared and a reamer  was used to deepen this to allow for medialization of the acetabular implant. Sequential reamers were then used up to the final reamer of 60 mm which was the appropriate one to allow for rim to rim fit. A trial was used to confirm the reaming was adequate  and to confirm the orientation of the implant. Some osteophytes were removed anterior and inferiorly.  Some direct posterior osteophytes were removed as well. The actual implant was then impacted in appropriate 25 degrees of anteversion and 45 degrees  of lateral tilt. The acetabular was checked for orientation and stability.  The acetabular liner was placed. A sponge was placed into the acetabulum to protect it during the femoral preparation.     The opening of the femoral canal was determined, and a canal finder was gently placed into the femoral shaft. The anteversion desired was determined. A canal finder was used. The shaft was sequentially broached until good fit and fill was obtained. Trial  reductions were performed. The femoral length was restored. The stability was tested with flexion to >90, Adduction to >45  and IR to >45.  The hip remained stable.  The leg lengths were equal, the hip was stable, and the shuck test had appropriate stability  as well.    At this point the actual implants were chosen. The femoral component was inserted until it was at the correct level and determined to be stable.  A trial reduction was attempted, and the actual head size and neck length was finalized. These were then  placed on the femoral trunnion and secured by malleting them in place. The hip was reduced and found to be stable with good leg lengths and appropriate motion and stability.  The wound was copiously irrigated with antibiotic irrigation. The capsule was closed with #2 Ethibond in a locking fashion. The capsule and piriformis  tendon tag sutures along with the other external rotators were brought through drill holes in the greater trochanter and secured.  The fascia Galina was closed with 2 Ethibond figure of eight interrupted sutures. The gluteus fascia was closed with 1 vicryl  running suture. The wound was irrigated again with antibiotic irrigation. The subcutaneous fat was approximated with 3-0 Vicryl simple sutures. The subcutaneous tissues were approximated with 3-0 Vicryl simple sutures. The skin was closed with steri-strips.  A dry sterile self- suctioning JOSEF dressing was applied All counts were correct at the end of the procedure. The patient tolerated the procedure well and returned to the recovery room in stable condition.                          Electronic Signatures:  Dima Gastelum)  (Signed 14-Aug-2023 13:50)   Authored: Post-Operative Note, Chart Review, Note Completion      Last Updated: 14-Aug-2023 13:50 by Dima Gastelum)

## 2023-10-03 ENCOUNTER — OFFICE VISIT (OUTPATIENT)
Dept: PRIMARY CARE | Facility: CLINIC | Age: 68
End: 2023-10-03
Payer: MEDICARE

## 2023-10-03 VITALS
HEART RATE: 68 BPM | HEIGHT: 72 IN | DIASTOLIC BLOOD PRESSURE: 80 MMHG | SYSTOLIC BLOOD PRESSURE: 128 MMHG | WEIGHT: 276 LBS | BODY MASS INDEX: 37.38 KG/M2

## 2023-10-03 DIAGNOSIS — Z12.11 ENCOUNTER FOR SCREENING FOR MALIGNANT NEOPLASM OF COLON: ICD-10-CM

## 2023-10-03 DIAGNOSIS — Z71.89 ADVANCE DIRECTIVE DISCUSSED WITH PATIENT: ICD-10-CM

## 2023-10-03 DIAGNOSIS — Z13.31 DEPRESSION SCREENING: ICD-10-CM

## 2023-10-03 DIAGNOSIS — Z71.89 CARDIAC RISK COUNSELING: ICD-10-CM

## 2023-10-03 DIAGNOSIS — Z12.5 PROSTATE CANCER SCREENING: ICD-10-CM

## 2023-10-03 DIAGNOSIS — Z00.00 ROUTINE GENERAL MEDICAL EXAMINATION AT A HEALTH CARE FACILITY: Primary | ICD-10-CM

## 2023-10-03 DIAGNOSIS — R73.03 PRE-DIABETES: ICD-10-CM

## 2023-10-03 DIAGNOSIS — E78.1 HYPERTRIGLYCERIDEMIA: ICD-10-CM

## 2023-10-03 DIAGNOSIS — I10 PRIMARY HYPERTENSION: ICD-10-CM

## 2023-10-03 PROCEDURE — G0446 INTENS BEHAVE THER CARDIO DX: HCPCS | Performed by: STUDENT IN AN ORGANIZED HEALTH CARE EDUCATION/TRAINING PROGRAM

## 2023-10-03 PROCEDURE — 1158F ADVNC CARE PLAN TLK DOCD: CPT | Performed by: STUDENT IN AN ORGANIZED HEALTH CARE EDUCATION/TRAINING PROGRAM

## 2023-10-03 PROCEDURE — 1170F FXNL STATUS ASSESSED: CPT | Performed by: STUDENT IN AN ORGANIZED HEALTH CARE EDUCATION/TRAINING PROGRAM

## 2023-10-03 PROCEDURE — 3079F DIAST BP 80-89 MM HG: CPT | Performed by: STUDENT IN AN ORGANIZED HEALTH CARE EDUCATION/TRAINING PROGRAM

## 2023-10-03 PROCEDURE — 99213 OFFICE O/P EST LOW 20 MIN: CPT | Performed by: STUDENT IN AN ORGANIZED HEALTH CARE EDUCATION/TRAINING PROGRAM

## 2023-10-03 PROCEDURE — G0444 DEPRESSION SCREEN ANNUAL: HCPCS | Performed by: STUDENT IN AN ORGANIZED HEALTH CARE EDUCATION/TRAINING PROGRAM

## 2023-10-03 PROCEDURE — 1160F RVW MEDS BY RX/DR IN RCRD: CPT | Performed by: STUDENT IN AN ORGANIZED HEALTH CARE EDUCATION/TRAINING PROGRAM

## 2023-10-03 PROCEDURE — 1159F MED LIST DOCD IN RCRD: CPT | Performed by: STUDENT IN AN ORGANIZED HEALTH CARE EDUCATION/TRAINING PROGRAM

## 2023-10-03 PROCEDURE — 99497 ADVNCD CARE PLAN 30 MIN: CPT | Performed by: STUDENT IN AN ORGANIZED HEALTH CARE EDUCATION/TRAINING PROGRAM

## 2023-10-03 PROCEDURE — G0439 PPPS, SUBSEQ VISIT: HCPCS | Performed by: STUDENT IN AN ORGANIZED HEALTH CARE EDUCATION/TRAINING PROGRAM

## 2023-10-03 PROCEDURE — 3074F SYST BP LT 130 MM HG: CPT | Performed by: STUDENT IN AN ORGANIZED HEALTH CARE EDUCATION/TRAINING PROGRAM

## 2023-10-03 PROCEDURE — 1036F TOBACCO NON-USER: CPT | Performed by: STUDENT IN AN ORGANIZED HEALTH CARE EDUCATION/TRAINING PROGRAM

## 2023-10-03 RX ORDER — EZETIMIBE 10 MG/1
10 TABLET ORAL DAILY
Qty: 30 TABLET | Refills: 5 | Status: SHIPPED | OUTPATIENT
Start: 2023-10-03 | End: 2023-10-03 | Stop reason: ALTCHOICE

## 2023-10-03 RX ORDER — ENALAPRIL MALEATE AND HYDROCHLOROTHIAZIDE 5; 12.5 MG/1; MG/1
1 TABLET ORAL DAILY
Qty: 90 TABLET | Refills: 3 | Status: SHIPPED | OUTPATIENT
Start: 2023-10-03 | End: 2024-10-02

## 2023-10-03 ASSESSMENT — ACTIVITIES OF DAILY LIVING (ADL)
BATHING: INDEPENDENT
MANAGING_FINANCES: INDEPENDENT
DOING_HOUSEWORK: INDEPENDENT
DRESSING: INDEPENDENT
GROCERY_SHOPPING: INDEPENDENT
TAKING_MEDICATION: INDEPENDENT

## 2023-10-03 NOTE — ACP (ADVANCE CARE PLANNING)
Confirming Previous Code Status:   Advance Care Planning Note     Discussion Date: 10/03/23   Discussion Participants: patient    The patient wishes to discuss Advance Care Planning today and the following is a brief summary of our discussion.     Patient has capacity to make their own medical decisions: Yes  Health Care Agent/Surrogate Decision Maker documented in chart: Yes    Documents on file and valid:  Advance Directive/Living Will: No   Health Care Power of : No    Communication of Medical Status/Prognosis:   Wife - Eunice Nguyen     Communication of Treatment Goals/Options:   Wife - Eunice Nguyen      Treatment Decisions  Goals of Care: survival is prioritized, if goals for quality or survival can reasonably be achieved     Follow Up Plan  1 year    Paul Garcia MD MPH  10/3/2023 8:09 AM

## 2023-10-03 NOTE — PROGRESS NOTES
Subjective   Reason for Visit: Ji Nguyen is an 68 y.o. male here for a Medicare Wellness visit.     Past Medical, Surgical, and Family History reviewed and updated in chart.    Reviewed all medications by prescribing practitioner or clinical pharmacist (such as prescriptions, OTCs, herbal therapies and supplements) and documented in the medical record.    Chief Complaint   Patient presents with    Medicare Annual Wellness Visit Subsequent     Med check ,pt c/o joint pain, needs different med for cholesterol      HPI  Reports that he is in overall good health.     Has undergone hip replacement and has recovered well.     Has generalized joint and back ache. Discussed conservative measures.     Has not been taking cholesterol medication. It has been causing hyperglycemia. Since stopping glucose levels have improved. Patient would like to control with diet and weight loss.     HTN: under control with current regimen.     Depression Screening done  Advanced Directives Discussion Completed  Cardiovascular risk discussed and if needed, lifestyle modifications recommended, including nutritional choices, exercise, and elimination of habits contributing to risk.  We agreed on a plan to reduce the current cardiovascular risk.  See ecalc ASCVD Risk  Plus for data discussed regarding risk and risk reduction opportunities.  Aspirin use/disuse was discussed after reviewing the updated guidelines.     The 10-year ASCVD risk score (Navid GROSSMAN, et al., 2019) is: 16.4%    Values used to calculate the score:      Age: 68 years      Sex: Male      Is Non- : No      Diabetic: No      Tobacco smoker: No      Systolic Blood Pressure: 128 mmHg      Is BP treated: Yes      HDL Cholesterol: 41 mg/dL      Total Cholesterol: 158 mg/dL    Patient Care Team:  Paul Garcia MD MPH as PCP - General  Paul Garcia MD MPH as PCP - [employer] PCP     Review of Systems  ROS  negative except discussed above in HPI.    Objective   Vitals:  /80   Pulse 68   Ht 1.829 m (6')   Wt 125 kg (276 lb)   BMI 37.43 kg/m²       Physical Exam  Constitutional:       Appearance: Normal appearance.   Cardiovascular:      Rate and Rhythm: Normal rate and regular rhythm.   Pulmonary:      Effort: Pulmonary effort is normal.      Breath sounds: Normal breath sounds.   Musculoskeletal:      Cervical back: Normal range of motion and neck supple.      Right lower leg: Edema (trace) present.      Left lower leg: Edema (trace) present.   Lymphadenopathy:      Cervical: No cervical adenopathy.   Neurological:      Mental Status: He is alert.         Assessment/Plan   Problem List Items Addressed This Visit       Pre-diabetes    Relevant Orders    Hemoglobin A1C    Hypertriglyceridemia    Relevant Orders    Lipid Panel    HTN (hypertension)    Relevant Medications    enalapriL-hydrochlorothiazide 5-12.5 mg tablet    Other Relevant Orders    Comprehensive Metabolic Panel     Other Visit Diagnoses       Encounter for screening for malignant neoplasm of colon    -  Primary    Relevant Orders    Colonoscopy Screening    Prostate cancer screening        Relevant Orders    Prostate Specific Antigen, Screen          Follow up in 1 year.

## 2023-10-06 ENCOUNTER — TELEPHONE (OUTPATIENT)
Dept: PRIMARY CARE | Facility: CLINIC | Age: 68
End: 2023-10-06
Payer: MEDICARE

## 2023-10-06 NOTE — TELEPHONE ENCOUNTER
I spoke to pt verified that his bp med was sent to mail order at his apt  he voiced understanding

## 2023-10-24 ENCOUNTER — TELEPHONE (OUTPATIENT)
Dept: ORTHOPEDIC SURGERY | Facility: CLINIC | Age: 68
End: 2023-10-24
Payer: MEDICARE

## 2023-10-24 NOTE — TELEPHONE ENCOUNTER
PATIENT CALLED FOR AN OKAY FROM DR MARTINEZ TO HAVE A PROCEDURE DONE FROM HIS FAMILY DOCTOR. HAD HIP REPLACEMENT ON 8/14/23. HIS FAMILY DOCTOR WANTED TO MAKE SURE THIS WAS GOING TO BE OKAY.     PLEASE CALL PT [phone]

## 2023-11-27 ENCOUNTER — ANCILLARY PROCEDURE (OUTPATIENT)
Dept: RADIOLOGY | Facility: CLINIC | Age: 68
End: 2023-11-27
Payer: MEDICARE

## 2023-11-27 DIAGNOSIS — M25.552 PAIN IN LEFT HIP: ICD-10-CM

## 2023-11-27 PROCEDURE — 73502 X-RAY EXAM HIP UNI 2-3 VIEWS: CPT | Mod: LEFT SIDE | Performed by: RADIOLOGY

## 2023-11-27 PROCEDURE — 73502 X-RAY EXAM HIP UNI 2-3 VIEWS: CPT | Mod: LT

## 2023-11-28 ENCOUNTER — TELEPHONE (OUTPATIENT)
Dept: ORTHOPEDIC SURGERY | Facility: CLINIC | Age: 68
End: 2023-11-28

## 2023-11-28 ENCOUNTER — OFFICE VISIT (OUTPATIENT)
Dept: ORTHOPEDIC SURGERY | Facility: CLINIC | Age: 68
End: 2023-11-28
Payer: MEDICARE

## 2023-11-28 DIAGNOSIS — M25.552 HIP PAIN, LEFT: ICD-10-CM

## 2023-11-28 PROCEDURE — 99214 OFFICE O/P EST MOD 30 MIN: CPT | Performed by: SPECIALIST

## 2023-11-28 PROCEDURE — 76882 US LMTD JT/FCL EVL NVASC XTR: CPT | Performed by: SPECIALIST

## 2023-11-28 PROCEDURE — 1036F TOBACCO NON-USER: CPT | Performed by: SPECIALIST

## 2023-11-28 PROCEDURE — 1125F AMNT PAIN NOTED PAIN PRSNT: CPT | Performed by: SPECIALIST

## 2023-11-28 PROCEDURE — 1160F RVW MEDS BY RX/DR IN RCRD: CPT | Performed by: SPECIALIST

## 2023-11-28 PROCEDURE — 1158F ADVNC CARE PLAN TLK DOCD: CPT | Performed by: SPECIALIST

## 2023-11-28 PROCEDURE — 1159F MED LIST DOCD IN RCRD: CPT | Performed by: SPECIALIST

## 2023-11-28 RX ORDER — CLINDAMYCIN HYDROCHLORIDE 300 MG/1
600 CAPSULE ORAL ONCE AS NEEDED
Qty: 2 CAPSULE | Refills: 0 | Status: SHIPPED | OUTPATIENT
Start: 2023-11-28

## 2023-11-28 ASSESSMENT — PAIN - FUNCTIONAL ASSESSMENT: PAIN_FUNCTIONAL_ASSESSMENT: 0-10

## 2023-11-28 ASSESSMENT — PAIN SCALES - GENERAL: PAINLEVEL_OUTOF10: 1

## 2023-11-28 ASSESSMENT — PAIN DESCRIPTION - DESCRIPTORS: DESCRIPTORS: DULL

## 2023-11-28 NOTE — PROGRESS NOTES
Assessment/Plan   Encounter Diagnoses:  Hip pain, left  Hip pain, left  Assessment: Left hip total hip arthroplasty doing well after surgery.  He is 3 months from the procedure.  His only complaint is some start up pain which resolves after a few steps.    Plan:  Continue with his exercise program at home.  Follow-up in 3 months for reevaluation with new x-rays.  I discussed dental work and we gave him a prescription for clindamycin 600 to be used 30 minutes before dental procedures.  He is having some work done on a crown.       Subjective    Patient ID: Altagracia Nguyen is a 68 y.o. male.    Chief Complaint: Post-op of the Left Hip (X-Ray 11-28-23/Pain Rate 1/POV 8-14-23/No Ice oe Heat)     Last Surgery: No surgery found  Last Surgery Date: No surgery found    HPI  68-year-old who is 3-month status post left total hip replacement.  He states he is doing well with this.  His endurance and distance is increasing.  He is not using a cane anymore.  He does have some start up pain for a few steps.    OBJECTIVE: ORTHO EXAM  Left hip:    Well-healed incisional scar  Essentially normal gait with some antalgia on start up.  Slight Trendelenburg sign on start up  Skin healthy and intact  No tenderness to palpation over lumbar spine  No tenderness over greater trochanter  No tenderness referable to groin especially with IR     Full forward flexion  Symmetric motion, no loss of internal rotation   No weakness with resisted hip flexion, abduction or adduction     Negative flexion/adduction/internal rotation  Negative flexion/abduction/external rotation test  Negative straight leg raise  Neurovascular exam normal distally  IMAGE RESULTS:  XR hip w pelvis  Narrative: Interpreted By:  JAMES JO MD  MRN: [id number]  Patient Name: ALTAGRACIA NGUYEN     STUDY:  HIP, UNILATERAL W/PELVIS WHEN PERFORMED 2-3 VIEWS     INDICATION:  POST OP CARE  Z96.642: Status post left hip replacement.     COMPARISON:  August 14     ACCESSION  NUMBER(S):  [id number]     ORDERING CLINICIAN:  DIMA MARTINEZ     FINDINGS:  Left total hip arthroplasty without fracture, malalignment, or  periprosthetic lucency.     Impression: Satisfactory appearance left total hip arthroplasty.      ULTRASOUND  DIAGNOSTIC ULTRASOUND REPORT FINAL: Left HIP  Sonographer: Dima Martinez MD  Indication:  Hip Pain  Procedure: Ultrasound, extremity, nonvascular, real-time, COMPLETE, anatomic specific  Technique: B-Mode Ultrasound Examination performed using 8-13 MHz linear transducer with [employer]  STUDY TYPE:   1. ULTRASOUND EXTREMITY  2. REAL TIME WITH IMAGE DOCUMENTATION  3. NON VASCULAR  4. COMPLETE STUDY, INCLUDING BUT NOT LIMITED TO MUSCLE, TENDONS, LIGAMENTS, SOFT TISSUES, ADIPOSE TISSUE AND SUBCUTANEOUS TISSUE.  Site:   HIP  Live ultrasound was performed of the patient's  HIP and PERMANENTLY documented. This is a COMPLETE and FINAL ULTRASOUND REPORT of the patient's  HIP.  Non sterile gloves were used for this procedure with a sterile technique. Gauze pads were then used to clean the area after the procedure was compete. . I personally performed the ultrasound and reviewed the findings. These show:  Live ultrasound was performed of the patient's HIP that shows an intact IT BAND with the vastus lateralis muscle fibers showing normal striations.  No fluid amount noted within the trochanteric bursa. No evidence of osseous pathology deep to the trochanteric bursa.  No significant soft tissue fluid collection/abscess appreciated.  No joint effusion noted. The patient tolerated the procedure well.  No hematoma or free fluid is noted about the incision.  Procedures     Orders Placed This Encounter    Point of Care Ultrasound    clindamycin (Cleocin HCL) 300 mg capsule

## 2023-11-28 NOTE — ASSESSMENT & PLAN NOTE
Assessment: Left hip total hip arthroplasty doing well after surgery.  He is 3 months from the procedure.  His only complaint is some start up pain which resolves after a few steps.    Plan:  Continue with his exercise program at home.  Follow-up in 3 months for reevaluation with new x-rays.  I discussed dental work and we gave him a prescription for clindamycin 600 to be used 30 minutes before dental procedures.  He is having some work done on a crown.

## 2023-11-28 NOTE — TELEPHONE ENCOUNTER
----- Message from Daria Walters sent at 11/28/2023 10:25 AM EST -----  Regarding: HANDICAP TAG  PATIENT CALLED STATING THAT DR MARTINEZ TOLD HIM ABOUT THE HANDICAPPED TAG AND HE SAID HE WOULD THINK ABOUT IT AND CALL US. HE CALLED AND SAID HE THOUGHT ABOUT THE HANDICAPPED TAG AND STATED THAT HE WANTED TO GET IT. I LET PATIENT KNOW THAT I WOULD SEND A MESSAGE TO THE NURSE TO GET THAT ORDER PRINTED FOR HIM SO HE CAN PICK IT UP. PLEASE CONTACT PATIENT WHEN THIS IS READY TO BE PICKED UP.

## 2023-12-22 ENCOUNTER — TELEPHONE (OUTPATIENT)
Dept: SURGERY | Facility: CLINIC | Age: 68
End: 2023-12-22
Payer: MEDICARE

## 2023-12-22 NOTE — TELEPHONE ENCOUNTER
Left patient message to return call to schedule colonoscopy.     Spoke with pt on 4-11-24 and pt states  doesn't want pt laying down at this time. Pt will call back in the future to schedule colonoscopy.

## 2024-02-28 ENCOUNTER — HOSPITAL ENCOUNTER (OUTPATIENT)
Dept: RADIOLOGY | Facility: CLINIC | Age: 69
Discharge: HOME | End: 2024-02-28
Payer: MEDICARE

## 2024-02-28 DIAGNOSIS — M25.552 HIP PAIN, LEFT: ICD-10-CM

## 2024-02-28 PROCEDURE — 73502 X-RAY EXAM HIP UNI 2-3 VIEWS: CPT | Mod: LT

## 2024-02-28 PROCEDURE — 73502 X-RAY EXAM HIP UNI 2-3 VIEWS: CPT | Mod: LEFT SIDE

## 2024-02-29 ENCOUNTER — OFFICE VISIT (OUTPATIENT)
Dept: ORTHOPEDIC SURGERY | Facility: CLINIC | Age: 69
End: 2024-02-29
Payer: MEDICARE

## 2024-02-29 VITALS — BODY MASS INDEX: 39.06 KG/M2 | WEIGHT: 288 LBS

## 2024-02-29 DIAGNOSIS — M25.552 HIP PAIN, LEFT: ICD-10-CM

## 2024-02-29 PROCEDURE — 1160F RVW MEDS BY RX/DR IN RCRD: CPT | Performed by: SPECIALIST

## 2024-02-29 PROCEDURE — 1159F MED LIST DOCD IN RCRD: CPT | Performed by: SPECIALIST

## 2024-02-29 PROCEDURE — 99213 OFFICE O/P EST LOW 20 MIN: CPT | Performed by: SPECIALIST

## 2024-02-29 PROCEDURE — 1036F TOBACCO NON-USER: CPT | Performed by: SPECIALIST

## 2024-02-29 PROCEDURE — 1125F AMNT PAIN NOTED PAIN PRSNT: CPT | Performed by: SPECIALIST

## 2024-02-29 ASSESSMENT — PAIN - FUNCTIONAL ASSESSMENT: PAIN_FUNCTIONAL_ASSESSMENT: NO/DENIES PAIN

## 2024-02-29 NOTE — PROGRESS NOTES
Assessment/Plan   Encounter Diagnoses:  Hip pain, left  Hip pain, left  Assessment: Left hip total hip arthroplasty doing well after surgery.  He is 6 months from the procedure.  His only complaint is some start up pain which resolves after a few steps. This is much less frequent now.    Plan:  Continue with his exercise program at home.  Follow-up in 6 months for reevaluation with new x-rays.  I discussed dental work  I discussed hip precautions  I discussed avoiding jumping and ballistic motions as he has a ceramic head       Subjective    Patient ID: Ji Nguyen is a 69 y.o. male.    Chief Complaint: Follow-up of the Left Hip     Last Surgery: No surgery found  Last Surgery Date: No surgery found    HPI  68-year-old who is 3-month status post left total hip replacement.  He states he is doing well with this.  His endurance and distance is increasing.  He is not using a cane anymore.  He does have some start up pain for a few steps.    OBJECTIVE: ORTHO EXAM  Left hip:    Well-healed incisional scar  Essentially normal gait with some antalgia on start up.  Slight Trendelenburg sign on start up  Skin healthy and intact  No tenderness to palpation over lumbar spine  No tenderness over greater trochanter  No tenderness referable to groin especially with IR     Full forward flexion  Symmetric motion, no loss of internal rotation   No weakness with resisted hip flexion, abduction or adduction     Negative flexion/adduction/internal rotation  Negative flexion/abduction/external rotation test  Negative straight leg raise  Neurovascular exam normal distally  IMAGE RESULTS:  XR hip left 2 or 3 views  Narrative: Interpreted By:  Cl Munoz,   STUDY:  XR HIP LEFT 2 OR 3 VIEWS;  11/27/2023 8:50 am      INDICATION:  Signs/Symptoms: post-op care  M25.552: Hip pain, left.      COMPARISON:  08/24/2020      ACCESSION NUMBER(S):  [id number]      ORDERING CLINICIAN:  ETHAN MARTINEZ      TECHNIQUE:  4 radiographs of the left  hip and pelvis were performed.      FINDINGS:  The patient is status post total left hip arthroplasty. The implant  is well aligned. There is no sign of chuy-implant fracture or focal  lucency. There is no bone destruction or aggressive periosteal  reaction. No lytic or blastic lesions identified.      There are moderate to severe osteoarthritic changes of the right hip  with narrowing of the superior joint space similar to the previous  study. 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      There is bilateral sacroiliac osteoarthritis. The sacroiliac joints  remain patent and symmetric. There are mild discogenic degenerative  changes of the lower lumbar spine with levo convexity.      Impression: Status post total left hip arthroplasty. There is no sign of hardware  failure or chuy-implant fracture.      Persistent moderate to severe osteoarthritic changes of the right hip  and cam deformity of the proximal right femur.      No acute osseous abnormality.      Signed by: Cl Munoz 11/28/2023 6:26 PM  Dictation workstation:   [id number]      ULTRASOUND  none  Procedures     Orders Placed This Encounter    XR hip left with pelvis when performed 2 or 3 views    Point of Care Ultrasound

## 2024-02-29 NOTE — ASSESSMENT & PLAN NOTE
Assessment: Left hip total hip arthroplasty doing well after surgery.  He is 6 months from the procedure.  His only complaint is some start up pain which resolves after a few steps. This is much less frequent now.    Plan:  Continue with his exercise program at home.  Follow-up in 6 months for reevaluation with new x-rays.  I discussed dental work  I discussed hip precautions  I discussed avoiding jumping and ballistic motions as he has a ceramic head

## 2024-08-21 ENCOUNTER — HOSPITAL ENCOUNTER (OUTPATIENT)
Dept: RADIOLOGY | Facility: CLINIC | Age: 69
Discharge: HOME | End: 2024-08-21
Payer: MEDICARE

## 2024-08-21 DIAGNOSIS — M25.552 HIP PAIN, LEFT: ICD-10-CM

## 2024-08-21 PROCEDURE — 73502 X-RAY EXAM HIP UNI 2-3 VIEWS: CPT | Mod: LT

## 2024-08-23 ENCOUNTER — HOSPITAL ENCOUNTER (OUTPATIENT)
Dept: RADIOLOGY | Facility: EXTERNAL LOCATION | Age: 69
Discharge: HOME | End: 2024-08-23

## 2024-08-23 ENCOUNTER — APPOINTMENT (OUTPATIENT)
Dept: ORTHOPEDIC SURGERY | Facility: CLINIC | Age: 69
End: 2024-08-23
Payer: MEDICARE

## 2024-08-23 DIAGNOSIS — M25.552 HIP PAIN, LEFT: ICD-10-CM

## 2024-08-23 PROCEDURE — 99213 OFFICE O/P EST LOW 20 MIN: CPT | Performed by: SPECIALIST

## 2024-08-23 PROCEDURE — 1160F RVW MEDS BY RX/DR IN RCRD: CPT | Performed by: SPECIALIST

## 2024-08-23 PROCEDURE — 1036F TOBACCO NON-USER: CPT | Performed by: SPECIALIST

## 2024-08-23 PROCEDURE — 1159F MED LIST DOCD IN RCRD: CPT | Performed by: SPECIALIST

## 2024-08-23 ASSESSMENT — PAIN - FUNCTIONAL ASSESSMENT: PAIN_FUNCTIONAL_ASSESSMENT: NO/DENIES PAIN

## 2024-08-23 NOTE — PROGRESS NOTES
Assessment/Plan   Encounter Diagnoses:  Hip pain, left  Hip pain, left  Assessment: 1 year status post 8/14/2023 left total hip arthroplasty.  Patient is very pleased with his progress.  He has minimal symptoms referable to the hip.  Sometimes if he sits for very long time when he first gets up it takes a few steps for him to get his normal gait.  If he lies on that side on a hard surface he does get soreness.    Plan:  I discussed posterior dislocation precautions and encouraged him to keep this in the front of his mind so as to avoid problems.  He no longer needs to be concerned about antibiotics for dental work.  He does need to be concerned about antibiotics for any kind of infection.  Follow-up in 1 year for reevaluation with new x-rays of the AP pelvis and left hip.       Subjective    Patient ID: Ji Nguyen is a 69 y.o. male.    Chief Complaint: Follow-up of the Left Hip     Last Surgery: No surgery found  Last Surgery Date: No surgery found    HPI  69-year-old who is doing quite well 1 year status post total hip arthroplasty.    OBJECTIVE: ORTHO EXAM  Left hip exam.  Normal leg lengths.  The incision is clean and dry.  Range of motion of the hip was full within the limits of total hip arthroplasty.  He had no pain.  His calves are supple and nontender.  His thigh was supple and nontender.  He ambulates with a normal gait.  He states that very rarely if he has been sitting for a while he will have a few steps of antalgia.    IMAGE RESULTS:  Point of Care Ultrasound  These images are not reportable by radiology and will not be interpreted   by  Radiologists.  XR hip left with pelvis when performed 2 or 3 views  Narrative: Interpreted By:  Mansoor Calderon,   STUDY:  XR HIP LEFT WITH PELVIS WHEN PERFORMED 2 OR 3 VIEWS      INDICATION:  Signs/Symptoms:CHRONIC PAIN.      COMPARISON:  February 28, 2024      ACCESSION NUMBER(S):  [id number]      ORDERING CLINICIAN:  ETHAN MARTINEZ      FINDINGS:  Left total hip  arthroplasty without fracture, malalignment, or  periprosthetic lucency.      Impression: Satisfactory appearance left hip arthroplasty.      Signed by: Mansoor Calderon 8/23/2024 7:20 AM  Dictation workstation:   [id number]      ULTRASOUND  None    Procedures     Orders Placed This Encounter    Point of Care Ultrasound    Point of Care Ultrasound

## 2024-08-23 NOTE — ASSESSMENT & PLAN NOTE
Assessment: 1 year status post 8/14/2023 left total hip arthroplasty.  Patient is very pleased with his progress.  He has minimal symptoms referable to the hip.  Sometimes if he sits for very long time when he first gets up it takes a few steps for him to get his normal gait.  If he lies on that side on a hard surface he does get soreness.    Plan:  I discussed posterior dislocation precautions and encouraged him to keep this in the front of his mind so as to avoid problems.  He no longer needs to be concerned about antibiotics for dental work.  He does need to be concerned about antibiotics for any kind of infection.  Follow-up in 1 year for reevaluation with new x-rays of the AP pelvis and left hip.

## 2024-10-07 ENCOUNTER — OFFICE VISIT (OUTPATIENT)
Dept: PRIMARY CARE | Facility: CLINIC | Age: 69
End: 2024-10-07
Payer: MEDICARE

## 2024-10-07 ENCOUNTER — APPOINTMENT (OUTPATIENT)
Dept: PRIMARY CARE | Facility: CLINIC | Age: 69
End: 2024-10-07
Payer: MEDICARE

## 2024-10-07 VITALS
BODY MASS INDEX: 38.48 KG/M2 | OXYGEN SATURATION: 94 % | SYSTOLIC BLOOD PRESSURE: 150 MMHG | WEIGHT: 283.7 LBS | HEART RATE: 74 BPM | DIASTOLIC BLOOD PRESSURE: 90 MMHG

## 2024-10-07 DIAGNOSIS — Z00.00 HEALTHCARE MAINTENANCE: ICD-10-CM

## 2024-10-07 DIAGNOSIS — R73.03 PRE-DIABETES: ICD-10-CM

## 2024-10-07 DIAGNOSIS — Z12.11 COLON CANCER SCREENING: ICD-10-CM

## 2024-10-07 DIAGNOSIS — Z00.00 ROUTINE GENERAL MEDICAL EXAMINATION AT HEALTH CARE FACILITY: Primary | ICD-10-CM

## 2024-10-07 DIAGNOSIS — Z12.5 ENCOUNTER FOR SCREENING FOR MALIGNANT NEOPLASM OF PROSTATE: ICD-10-CM

## 2024-10-07 DIAGNOSIS — I77.810 AORTIC ECTASIA, THORACIC (CMS-HCC): ICD-10-CM

## 2024-10-07 DIAGNOSIS — I10 PRIMARY HYPERTENSION: ICD-10-CM

## 2024-10-07 PROCEDURE — 3080F DIAST BP >= 90 MM HG: CPT | Performed by: FAMILY MEDICINE

## 2024-10-07 PROCEDURE — 1160F RVW MEDS BY RX/DR IN RCRD: CPT | Performed by: FAMILY MEDICINE

## 2024-10-07 PROCEDURE — 1159F MED LIST DOCD IN RCRD: CPT | Performed by: FAMILY MEDICINE

## 2024-10-07 PROCEDURE — 99214 OFFICE O/P EST MOD 30 MIN: CPT | Performed by: FAMILY MEDICINE

## 2024-10-07 PROCEDURE — 3077F SYST BP >= 140 MM HG: CPT | Performed by: FAMILY MEDICINE

## 2024-10-07 PROCEDURE — 1158F ADVNC CARE PLAN TLK DOCD: CPT | Performed by: FAMILY MEDICINE

## 2024-10-07 PROCEDURE — 1123F ACP DISCUSS/DSCN MKR DOCD: CPT | Performed by: FAMILY MEDICINE

## 2024-10-07 PROCEDURE — G0439 PPPS, SUBSEQ VISIT: HCPCS | Performed by: FAMILY MEDICINE

## 2024-10-07 PROCEDURE — 1170F FXNL STATUS ASSESSED: CPT | Performed by: FAMILY MEDICINE

## 2024-10-07 PROCEDURE — 1036F TOBACCO NON-USER: CPT | Performed by: FAMILY MEDICINE

## 2024-10-07 RX ORDER — ENALAPRIL MALEATE AND HYDROCHLOROTHIAZIDE 5; 12.5 MG/1; MG/1
1 TABLET ORAL DAILY
Qty: 90 TABLET | Refills: 3 | Status: CANCELLED | OUTPATIENT
Start: 2024-10-07 | End: 2025-10-07

## 2024-10-07 RX ORDER — ENALAPRIL MALEATE AND HYDROCHLOROTHIAZIDE 10; 25 MG/1; MG/1
1 TABLET ORAL DAILY
Qty: 90 TABLET | Refills: 1 | Status: SHIPPED | OUTPATIENT
Start: 2024-10-07 | End: 2025-10-07

## 2024-10-07 ASSESSMENT — ENCOUNTER SYMPTOMS
DEPRESSION: 0
HYPERTENSION: 1
OCCASIONAL FEELINGS OF UNSTEADINESS: 0
LOSS OF SENSATION IN FEET: 0

## 2024-10-07 ASSESSMENT — ACTIVITIES OF DAILY LIVING (ADL)
DRESSING: INDEPENDENT
TAKING_MEDICATION: INDEPENDENT
BATHING: INDEPENDENT
DOING_HOUSEWORK: INDEPENDENT
MANAGING_FINANCES: INDEPENDENT
GROCERY_SHOPPING: INDEPENDENT

## 2024-10-07 ASSESSMENT — PATIENT HEALTH QUESTIONNAIRE - PHQ9
2. FEELING DOWN, DEPRESSED OR HOPELESS: NOT AT ALL
SUM OF ALL RESPONSES TO PHQ9 QUESTIONS 1 AND 2: 0
1. LITTLE INTEREST OR PLEASURE IN DOING THINGS: NOT AT ALL

## 2024-10-07 NOTE — ASSESSMENT & PLAN NOTE
Orders:    Hemoglobin A1C; Future    Basic Metabolic Panel; Future    Prostate Specific Antigen, Screen; Future

## 2024-10-07 NOTE — ASSESSMENT & PLAN NOTE
Orders:    Lipid Panel; Future    enalapriL-hydrochlorothiazide (Vaseretic) 10-25 mg tablet; Take 1 tablet by mouth once daily.

## 2024-10-07 NOTE — PROGRESS NOTES
Subjective   Reason for Visit: Ji Nguyen is an 69 y.o. male here for a Medicare Wellness visit.     Past Medical, Surgical, and Family History reviewed and updated in chart.    Reviewed all medications by prescribing practitioner or clinical pharmacist (such as prescriptions, OTCs, herbal therapies and supplements) and documented in the medical record.    Hypertension      Healthcare power of  living will yes   Code  DNR     P20 declined  all vaccines     Prediabetes     Will get labs today     HTN home 153/90 increase vasoretic     Stable ectasia of the ascending aorta up to 4.3 cm.  By CT May 5, 2023  A few tiny lung nodules measuring 3 mm or less in size, of doubtful  clinical significance in a low risk patient. If this is a high risk  patient, 12 month follow-up     Former smoker quit  40 years ago   Smoked 3 years only   Pt is low risk     THR left August 2023       Patient Care Team:  Greg Leon MD as PCP - General (Family Medicine)  Paul Garcia MD MPH as PCP - [employer] PCP     Review of Systems  No cp   No change in thirst or appetite  Wt gain     Objective   Vitals:  /90   Pulse 74   Wt 129 kg (283 lb 11.2 oz)   SpO2 94%   BMI 38.48 kg/m²       Physical Exam  Vitals reviewed.   Constitutional:       Appearance: Normal appearance.   HENT:      Head: Normocephalic and atraumatic.   Eyes:      Conjunctiva/sclera: Conjunctivae normal.   Cardiovascular:      Rate and Rhythm: Normal rate and regular rhythm.   Pulmonary:      Effort: Pulmonary effort is normal.      Breath sounds: Normal breath sounds.   Musculoskeletal:      Cervical back: Neck supple.   Skin:     General: Skin is warm and dry.   Neurological:      General: No focal deficit present.      Mental Status: He is alert and oriented to person, place, and time.   Psychiatric:         Mood and Affect: Mood normal.         Behavior: Behavior normal.         Thought Content: Thought content normal.          Judgment: Judgment normal.         Assessment & Plan  Primary hypertension    Orders:    Lipid Panel; Future    enalapriL-hydrochlorothiazide (Vaseretic) 10-25 mg tablet; Take 1 tablet by mouth once daily.    Routine general medical examination at health care facility    Orders:    1 Year Follow Up In Primary Care - Wellness Exam; Future    Colon cancer screening    Orders:    Referral to Gastroenterology; Future     Encounter for screening for malignant neoplasm of prostate    Orders:    Prostate Specific Antigen, Screen; Future    Healthcare maintenance    Orders:    Prostate Specific Antigen, Screen; Future    Pre-diabetes    Orders:    Hemoglobin A1C; Future    Basic Metabolic Panel; Future    Prostate Specific Antigen, Screen; Future    Aortic ectasia, thoracic (CMS-HCC)

## 2024-10-08 ENCOUNTER — LAB (OUTPATIENT)
Dept: LAB | Facility: LAB | Age: 69
End: 2024-10-08
Payer: MEDICARE

## 2024-10-08 DIAGNOSIS — I10 PRIMARY HYPERTENSION: ICD-10-CM

## 2024-10-08 DIAGNOSIS — Z12.5 ENCOUNTER FOR SCREENING FOR MALIGNANT NEOPLASM OF PROSTATE: ICD-10-CM

## 2024-10-08 DIAGNOSIS — Z00.00 HEALTHCARE MAINTENANCE: ICD-10-CM

## 2024-10-08 DIAGNOSIS — R73.03 PRE-DIABETES: ICD-10-CM

## 2024-10-08 DIAGNOSIS — Z12.11 COLON CANCER SCREENING: ICD-10-CM

## 2024-10-08 LAB
ANION GAP SERPL CALC-SCNC: 11 MMOL/L (ref 10–20)
BUN SERPL-MCNC: 32 MG/DL (ref 6–23)
CALCIUM SERPL-MCNC: 9.3 MG/DL (ref 8.6–10.3)
CHLORIDE SERPL-SCNC: 102 MMOL/L (ref 98–107)
CHOLEST SERPL-MCNC: 186 MG/DL (ref 0–199)
CHOLESTEROL/HDL RATIO: 4.8
CO2 SERPL-SCNC: 29 MMOL/L (ref 21–32)
CREAT SERPL-MCNC: 1.2 MG/DL (ref 0.5–1.3)
EGFRCR SERPLBLD CKD-EPI 2021: 66 ML/MIN/1.73M*2
EST. AVERAGE GLUCOSE BLD GHB EST-MCNC: 131 MG/DL
GLUCOSE SERPL-MCNC: 121 MG/DL (ref 74–99)
HBA1C MFR BLD: 6.2 %
HDLC SERPL-MCNC: 39 MG/DL
LDLC SERPL CALC-MCNC: 96 MG/DL
NON HDL CHOLESTEROL: 147 MG/DL (ref 0–149)
POTASSIUM SERPL-SCNC: 4.4 MMOL/L (ref 3.5–5.3)
PSA SERPL-MCNC: 1.06 NG/ML
SODIUM SERPL-SCNC: 138 MMOL/L (ref 136–145)
TRIGL SERPL-MCNC: 256 MG/DL (ref 0–149)
VLDL: 51 MG/DL (ref 0–40)

## 2024-10-08 PROCEDURE — 80061 LIPID PANEL: CPT

## 2024-10-08 PROCEDURE — 83036 HEMOGLOBIN GLYCOSYLATED A1C: CPT

## 2024-10-08 PROCEDURE — 80048 BASIC METABOLIC PNL TOTAL CA: CPT

## 2024-10-08 PROCEDURE — G0103 PSA SCREENING: HCPCS

## 2024-10-08 PROCEDURE — 36415 COLL VENOUS BLD VENIPUNCTURE: CPT

## 2024-10-09 DIAGNOSIS — E78.1 HIGH TRIGLYCERIDES: Primary | ICD-10-CM

## 2024-10-09 RX ORDER — ROSUVASTATIN CALCIUM 20 MG/1
20 TABLET, COATED ORAL DAILY
Qty: 100 TABLET | Refills: 3 | Status: SHIPPED | OUTPATIENT
Start: 2024-10-09 | End: 2025-11-13

## 2024-10-29 ENCOUNTER — APPOINTMENT (OUTPATIENT)
Dept: GASTROENTEROLOGY | Facility: CLINIC | Age: 69
End: 2024-10-29
Payer: MEDICARE

## 2024-10-29 ENCOUNTER — HOSPITAL ENCOUNTER (OUTPATIENT)
Dept: OPERATING ROOM | Facility: HOSPITAL | Age: 69
Setting detail: OUTPATIENT SURGERY
Discharge: HOME | End: 2024-10-29
Payer: MEDICARE

## 2024-10-29 VITALS
RESPIRATION RATE: 18 BRPM | HEART RATE: 76 BPM | SYSTOLIC BLOOD PRESSURE: 143 MMHG | HEIGHT: 76 IN | BODY MASS INDEX: 33.85 KG/M2 | WEIGHT: 278 LBS | OXYGEN SATURATION: 93 % | TEMPERATURE: 97.3 F | DIASTOLIC BLOOD PRESSURE: 85 MMHG

## 2024-10-29 DIAGNOSIS — Z12.11 COLON CANCER SCREENING: ICD-10-CM

## 2024-10-29 PROCEDURE — G0121 COLON CA SCRN NOT HI RSK IND: HCPCS | Performed by: INTERNAL MEDICINE

## 2024-10-29 PROCEDURE — 3700000012 HC SEDATION LEVEL 5+ TIME - INITIAL 15 MINUTES 5/> YEARS: Performed by: INTERNAL MEDICINE

## 2024-10-29 PROCEDURE — G0500 MOD SEDAT ENDO SERVICE >5YRS: HCPCS | Performed by: INTERNAL MEDICINE

## 2024-10-29 PROCEDURE — 7100000010 HC PHASE TWO TIME - EACH INCREMENTAL 1 MINUTE: Performed by: INTERNAL MEDICINE

## 2024-10-29 PROCEDURE — 3600000007 HC OR TIME - EACH INCREMENTAL 1 MINUTE - PROCEDURE LEVEL TWO: Performed by: INTERNAL MEDICINE

## 2024-10-29 PROCEDURE — 2500000004 HC RX 250 GENERAL PHARMACY W/ HCPCS (ALT 636 FOR OP/ED): Performed by: INTERNAL MEDICINE

## 2024-10-29 PROCEDURE — 3600000002 HC OR TIME - INITIAL BASE CHARGE - PROCEDURE LEVEL TWO: Performed by: INTERNAL MEDICINE

## 2024-10-29 PROCEDURE — 7100000009 HC PHASE TWO TIME - INITIAL BASE CHARGE: Performed by: INTERNAL MEDICINE

## 2024-10-29 RX ORDER — MIDAZOLAM HYDROCHLORIDE 5 MG/ML
INJECTION, SOLUTION INTRAMUSCULAR; INTRAVENOUS AS NEEDED
Status: COMPLETED | OUTPATIENT
Start: 2024-10-29 | End: 2024-10-29

## 2024-10-29 RX ORDER — MEPERIDINE HYDROCHLORIDE 25 MG/ML
INJECTION INTRAMUSCULAR; INTRAVENOUS; SUBCUTANEOUS AS NEEDED
Status: COMPLETED | OUTPATIENT
Start: 2024-10-29 | End: 2024-10-29

## 2024-10-29 ASSESSMENT — COLUMBIA-SUICIDE SEVERITY RATING SCALE - C-SSRS
6. HAVE YOU EVER DONE ANYTHING, STARTED TO DO ANYTHING, OR PREPARED TO DO ANYTHING TO END YOUR LIFE?: NO
2. HAVE YOU ACTUALLY HAD ANY THOUGHTS OF KILLING YOURSELF?: NO
1. IN THE PAST MONTH, HAVE YOU WISHED YOU WERE DEAD OR WISHED YOU COULD GO TO SLEEP AND NOT WAKE UP?: NO

## 2024-10-29 ASSESSMENT — PAIN - FUNCTIONAL ASSESSMENT
PAIN_FUNCTIONAL_ASSESSMENT: 0-10

## 2024-10-29 ASSESSMENT — PAIN SCALES - GENERAL
PAINLEVEL_OUTOF10: 0 - NO PAIN

## 2025-02-18 DIAGNOSIS — I10 PRIMARY HYPERTENSION: ICD-10-CM

## 2025-02-18 RX ORDER — ENALAPRIL MALEATE AND HYDROCHLOROTHIAZIDE 10; 25 MG/1; MG/1
1 TABLET ORAL DAILY
Qty: 90 TABLET | Refills: 1 | OUTPATIENT
Start: 2025-02-18

## 2025-04-07 ENCOUNTER — APPOINTMENT (OUTPATIENT)
Dept: PRIMARY CARE | Facility: CLINIC | Age: 70
End: 2025-04-07
Payer: MEDICARE

## 2025-04-07 VITALS
SYSTOLIC BLOOD PRESSURE: 150 MMHG | DIASTOLIC BLOOD PRESSURE: 90 MMHG | OXYGEN SATURATION: 98 % | BODY MASS INDEX: 35.17 KG/M2 | HEART RATE: 69 BPM | WEIGHT: 289.1 LBS

## 2025-04-07 DIAGNOSIS — J30.89 NON-SEASONAL ALLERGIC RHINITIS DUE TO OTHER ALLERGIC TRIGGER: ICD-10-CM

## 2025-04-07 DIAGNOSIS — I10 PRIMARY HYPERTENSION: ICD-10-CM

## 2025-04-07 DIAGNOSIS — E78.1 HIGH TRIGLYCERIDES: ICD-10-CM

## 2025-04-07 DIAGNOSIS — Z00.00 HEALTHCARE MAINTENANCE: ICD-10-CM

## 2025-04-07 DIAGNOSIS — Z00.00 ROUTINE GENERAL MEDICAL EXAMINATION AT HEALTH CARE FACILITY: ICD-10-CM

## 2025-04-07 DIAGNOSIS — Z12.5 ENCOUNTER FOR SCREENING FOR MALIGNANT NEOPLASM OF PROSTATE: Primary | ICD-10-CM

## 2025-04-07 DIAGNOSIS — R73.03 PRE-DIABETES: ICD-10-CM

## 2025-04-07 PROCEDURE — 3080F DIAST BP >= 90 MM HG: CPT | Performed by: FAMILY MEDICINE

## 2025-04-07 PROCEDURE — 1036F TOBACCO NON-USER: CPT | Performed by: FAMILY MEDICINE

## 2025-04-07 PROCEDURE — 1159F MED LIST DOCD IN RCRD: CPT | Performed by: FAMILY MEDICINE

## 2025-04-07 PROCEDURE — 3077F SYST BP >= 140 MM HG: CPT | Performed by: FAMILY MEDICINE

## 2025-04-07 PROCEDURE — 99214 OFFICE O/P EST MOD 30 MIN: CPT | Performed by: FAMILY MEDICINE

## 2025-04-07 RX ORDER — ROSUVASTATIN CALCIUM 20 MG/1
20 TABLET, COATED ORAL DAILY
Qty: 30 TABLET | Refills: 0 | Status: SHIPPED | OUTPATIENT
Start: 2025-04-07

## 2025-04-07 RX ORDER — ENALAPRIL MALEATE AND HYDROCHLOROTHIAZIDE 10; 25 MG/1; MG/1
1 TABLET ORAL DAILY
Qty: 100 TABLET | Refills: 2 | Status: SHIPPED | OUTPATIENT
Start: 2025-04-07

## 2025-04-07 RX ORDER — ROSUVASTATIN CALCIUM 20 MG/1
20 TABLET, COATED ORAL DAILY
Qty: 100 TABLET | Refills: 2 | Status: SHIPPED | OUTPATIENT
Start: 2025-04-07 | End: 2026-05-12

## 2025-04-07 RX ORDER — ENALAPRIL MALEATE AND HYDROCHLOROTHIAZIDE 10; 25 MG/1; MG/1
1 TABLET ORAL DAILY
Qty: 30 TABLET | Refills: 0 | Status: SHIPPED | OUTPATIENT
Start: 2025-04-07

## 2025-04-07 ASSESSMENT — ENCOUNTER SYMPTOMS
PALPITATIONS: 0
COUGH: 1
WHEEZING: 0
HEADACHES: 0
POLYDIPSIA: 0
SHORTNESS OF BREATH: 0
POLYPHAGIA: 0

## 2025-04-07 ASSESSMENT — PATIENT HEALTH QUESTIONNAIRE - PHQ9
1. LITTLE INTEREST OR PLEASURE IN DOING THINGS: NOT AT ALL
2. FEELING DOWN, DEPRESSED OR HOPELESS: NOT AT ALL
SUM OF ALL RESPONSES TO PHQ9 QUESTIONS 1 AND 2: 0

## 2025-04-07 NOTE — PROGRESS NOTES
Subjective   Patient ID: Ji Nguyen is a 70 y.o. adult who presents for Hypertension (BP runs pretty normal when he checks it at home (130's/80's)), Hyperlipidemia, and Thick phleum in throat (Has tried Mucinex, allergy medication.  8-10 months ago started and is always there. ).  HPI  Patient Health Questionnaire-2 Score: 0 (4/7/2025  9:06 AM)     Prediabetes A1c 6.2 October 8, 2024     HTN home 153/90 increase vasoretic      Former smoker quit  40 years ago   Smoked 3 years only   Pt is low risk      THR left August 2023     Has tried otc allergy zyrtec   No nasal spray       Review of Systems   Respiratory:  Positive for cough (mucus clearing throat). Negative for shortness of breath and wheezing.    Cardiovascular:  Negative for chest pain and palpitations (when laying down feels 10 to 12 beats of stronger beat, no associ lightheaded or dizziness).   Endocrine: Negative for polydipsia, polyphagia and polyuria.   Neurological:  Negative for headaches.       Objective   Visit Vitals  /90   Pulse 69   Wt 131 kg (289 lb 1.6 oz)   SpO2 98%   BMI 35.17 kg/m²   Smoking Status Never   BSA 2.65 m²       Physical Exam  Constitutional:       Appearance: Normal appearance.   HENT:      Head: Normocephalic and atraumatic.      Right Ear: There is impacted cerumen.      Nose: Nose normal. No congestion or rhinorrhea.      Comments: Deviated nasal septum to the left     Mouth/Throat:      Mouth: Mucous membranes are moist.      Pharynx: Oropharynx is clear. No oropharyngeal exudate or posterior oropharyngeal erythema.   Eyes:      Conjunctiva/sclera: Conjunctivae normal.      Pupils: Pupils are equal, round, and reactive to light.   Cardiovascular:      Rate and Rhythm: Normal rate and regular rhythm.      Heart sounds: Normal heart sounds.   Pulmonary:      Effort: Pulmonary effort is normal.      Breath sounds: Normal breath sounds.   Lymphadenopathy:      Cervical: No cervical adenopathy.   Skin:     Coloration:  Skin is not jaundiced.   Neurological:      General: No focal deficit present.      Mental Status: She is alert and oriented to person, place, and time.   Psychiatric:         Mood and Affect: Mood normal.         Behavior: Behavior normal.         Thought Content: Thought content normal.         Judgment: Judgment normal.              Assessment/Plan   Diagnoses and all orders for this visit:  Encounter for screening for malignant neoplasm of prostate  -     Prostate Specific Antigen, Screen; Future  Routine general medical examination at health care facility  -     1 Year Follow Up In Primary Care - Wellness Exam  Primary hypertension  -     enalapriL-hydrochlorothiazide (Vaseretic) 10-25 mg tablet; Take 1 tablet by mouth once daily.  -     enalapriL-hydrochlorothiazide (Vaseretic) 10-25 mg tablet; Take 1 tablet by mouth once daily.  -     rosuvastatin (Crestor) 20 mg tablet; Take 1 tablet (20 mg) by mouth once daily.  -     Lipid Panel; Future  -     Comprehensive Metabolic Panel; Future  High triglycerides  -     rosuvastatin (Crestor) 20 mg tablet; Take 1 tablet (20 mg) by mouth once daily.  Healthcare maintenance  -     Prostate Specific Antigen, Screen; Future  Pre-diabetes  -     Hemoglobin A1C; Future  Non-seasonal allergic rhinitis due to other allergic trigger       Recommend over-the-counter Nasonex or Flonase    Greg Leon MD 04/07/25 9:25 AM

## 2025-08-05 DIAGNOSIS — Z12.31 ENCOUNTER FOR SCREENING MAMMOGRAM FOR BREAST CANCER: ICD-10-CM

## 2025-08-11 DIAGNOSIS — Z96.642 STATUS POST TOTAL REPLACEMENT OF LEFT HIP: ICD-10-CM

## 2025-08-22 ENCOUNTER — APPOINTMENT (OUTPATIENT)
Dept: ORTHOPEDIC SURGERY | Facility: CLINIC | Age: 70
End: 2025-08-22
Payer: MEDICARE

## 2025-08-22 ENCOUNTER — APPOINTMENT (OUTPATIENT)
Dept: RADIOLOGY | Facility: CLINIC | Age: 70
End: 2025-08-22
Payer: MEDICARE

## 2025-10-07 ENCOUNTER — APPOINTMENT (OUTPATIENT)
Dept: PRIMARY CARE | Facility: CLINIC | Age: 70
End: 2025-10-07
Payer: MEDICARE